# Patient Record
Sex: FEMALE | Race: BLACK OR AFRICAN AMERICAN | NOT HISPANIC OR LATINO | Employment: UNEMPLOYED | URBAN - METROPOLITAN AREA
[De-identification: names, ages, dates, MRNs, and addresses within clinical notes are randomized per-mention and may not be internally consistent; named-entity substitution may affect disease eponyms.]

---

## 2024-01-03 ENCOUNTER — TELEPHONE (OUTPATIENT)
Dept: OBGYN | Facility: CLINIC | Age: 29
End: 2024-01-03

## 2024-01-03 ENCOUNTER — OFFICE VISIT (OUTPATIENT)
Dept: URGENT CARE | Facility: URGENT CARE | Age: 29
End: 2024-01-03
Payer: MEDICAID

## 2024-01-03 VITALS
WEIGHT: 217.1 LBS | HEART RATE: 62 BPM | DIASTOLIC BLOOD PRESSURE: 74 MMHG | SYSTOLIC BLOOD PRESSURE: 125 MMHG | OXYGEN SATURATION: 100 % | TEMPERATURE: 98.1 F | RESPIRATION RATE: 16 BRPM

## 2024-01-03 DIAGNOSIS — Z32.01 PREGNANCY TEST POSITIVE: Primary | ICD-10-CM

## 2024-01-03 DIAGNOSIS — N92.6 MISSED MENSES: ICD-10-CM

## 2024-01-03 LAB — HCG UR QL: POSITIVE

## 2024-01-03 PROCEDURE — 81025 URINE PREGNANCY TEST: CPT | Performed by: NURSE PRACTITIONER

## 2024-01-03 PROCEDURE — 99203 OFFICE O/P NEW LOW 30 MIN: CPT | Performed by: NURSE PRACTITIONER

## 2024-01-03 NOTE — PROGRESS NOTES
SUBJECTIVE: Jayshree is a 28 year old female who comes in today for pregnancy confirmation.  She has not done a pregnancy test at home  This pregnancy was Unplanned, Undesired and they are not planning to parent.    She is a    She is not taking a prenatal vitamin.    She currently complains of nausea &/or vomiting and fatigue.  LMP 11/13/23  Has no insurance    Past medical and OB/Gyn history is as documented in the chart.    OBJECTIVE:   /74 (BP Location: Left arm, Patient Position: Sitting, Cuff Size: Adult Regular)   Pulse 62   Temp 98.1  F (36.7  C) (Tympanic)   Resp 16   Wt 98.5 kg (217 lb 1.6 oz)   LMP 11/13/2023   SpO2 100%     She appears well, in no apparent distress.  Alert and oriented times three, pleasant and cooperative.   Urine HCG was: positive    ASSESSMENT: (Z32.01) Pregnancy test positive  (primary encounter diagnosis)  (N92.6) Missed menses    Plan:    Ob/Gyn   Referral, Specialty Care Coordination Referral to follow up to help her with elective termination     AL Villegas CNP

## 2024-01-03 NOTE — TELEPHONE ENCOUNTER
Pre-Medication  Assessment:    Jayshree Hdz    When was your positive pregnancy test? 1/3/24       Was the test more than 52 days ago (before 23)? No    Were you using hormonal birth control, an IUD or emergency contraception when you got pregnant? No    Have you ever been diagnosed with an ectopic pregnancy? No    Have you ever had a tubal ligation or sterilization procedure? No    Have you ever been diagnosed with pelvic inflammatory disease? No    Are you having one-sided pelvic pain? No    How long are your typical menstrual cycles /  How many days from the start of one period to the start of the next one?  28-29 - periods were regular  -lasted 3-5 days     During the past 3 months, has the time between periods varied from your typical cycles by more than a few days? No, cycles have been regular.    When was the first day of your last period? Patient's last menstrual period was 2023. Patient is sure of LMP date.    Was LMP more than 10 weeks (70 days) ago (before 10/25/23)? No    Did your last period start earlier or later than you would have expected? No    Was your last period shorter than usual? No    Was the amount of bleeding/cramping in your last period normal for you? Yes    Have you had any other recent bleeding that was not normal for you? No      Have you ever been diagnosed with:    An allergy or intolerance to mifepristone or misoprostol?  No    Chronic renal failure?  No    Hemorrhagic disorders? No    Inherited porphyria? No    Have you used systemic corticosteroid therapy long term?  No    Are you currently on anticoagulation therapy (excluding aspirin)?  No    Based on the responses given by the patient, an ultrasound is not indicated.    Patient denies all contraindications, will proceed with scheduling medication termination of pregnancy appointment.      Yamel Quezada RN

## 2024-01-05 ENCOUNTER — OFFICE VISIT (OUTPATIENT)
Dept: MIDWIFE SERVICES | Facility: CLINIC | Age: 29
End: 2024-01-05
Payer: MEDICAID

## 2024-01-05 VITALS
HEART RATE: 99 BPM | DIASTOLIC BLOOD PRESSURE: 79 MMHG | SYSTOLIC BLOOD PRESSURE: 123 MMHG | WEIGHT: 218 LBS | OXYGEN SATURATION: 100 %

## 2024-01-05 DIAGNOSIS — Z33.2 TERMINATION OF PREGNANCY (FETUS): Primary | ICD-10-CM

## 2024-01-05 PROCEDURE — 99203 OFFICE O/P NEW LOW 30 MIN: CPT | Performed by: ADVANCED PRACTICE MIDWIFE

## 2024-01-05 PROCEDURE — S0190 MIFEPRISTONE, ORAL, 200 MG: HCPCS | Performed by: ADVANCED PRACTICE MIDWIFE

## 2024-01-05 RX ORDER — MISOPROSTOL 200 UG/1
800 TABLET ORAL ONCE
Qty: 4 TABLET | Refills: 1 | Status: SHIPPED | OUTPATIENT
Start: 2024-01-05 | End: 2024-01-05

## 2024-01-05 RX ORDER — MIFEPRISTONE 200 MG/1
200 TABLET ORAL ONCE
Status: COMPLETED | OUTPATIENT
Start: 2024-01-05 | End: 2024-01-05

## 2024-01-05 RX ORDER — MIFEPRISTONE 200 MG/1
200 TABLET ORAL ONCE
Status: CANCELLED | OUTPATIENT
Start: 2024-01-05 | End: 2024-01-05

## 2024-01-05 RX ORDER — MISOPROSTOL 200 UG/1
800 TABLET ORAL ONCE
Qty: 4 TABLET | Refills: 1 | Status: CANCELLED | OUTPATIENT
Start: 2024-01-05 | End: 2024-01-05

## 2024-01-05 RX ORDER — ONDANSETRON 4 MG/1
4 TABLET, FILM COATED ORAL EVERY 6 HOURS PRN
Qty: 10 TABLET | Refills: 0 | Status: SHIPPED | OUTPATIENT
Start: 2024-01-05

## 2024-01-05 RX ADMIN — MIFEPRISTONE 200 MG: 200 TABLET ORAL at 15:19

## 2024-01-05 ASSESSMENT — PATIENT HEALTH QUESTIONNAIRE - PHQ9: SUM OF ALL RESPONSES TO PHQ QUESTIONS 1-9: 7

## 2024-01-05 NOTE — PROGRESS NOTES
2024         Subjective:  Jayshree Hdz is a 28 year old No obstetric history on file. at Unknown here today for medication .  Patient has been counseled on pregnancy options and desires medical termination of pregnancy.       Objective:  Dating:  Patient's last menstrual period was 2023.    OB History   No obstetric history on file.        No past medical history on file.    No past surgical history on file.    Current Outpatient Medications   Medication    misoprostol (CYTOTEC) 200 MCG tablet    ondansetron (ZOFRAN) 4 MG tablet     Current Facility-Administered Medications   Medication    miFEPRIStone (MIFEPREX) tablet 200 mg       No Known Allergies          Vitals:    24 1449   BP: 123/79   Pulse: 99   SpO2: 100%   Weight: 98.9 kg (218 lb)       There is no height or weight on file to calculate BMI.     Gen: well-appearing, cooperative, well-groomed      Assessment and Plan:  Jayshree Hdz is a 28 year old No obstetric history on file. at Unknown as dated by LMP who desires termination of pregnancy with medication .     Based the pre-medication  ultrasound assessment, ultrasound prior to  IS NOT indicated.     The patient is appropriate for medication  with:  mifepristone and misoprostol.     Prior to the administration/prescribing of mifepristone, the patient received the medication guide and signed the patient agreement.      Mifepristone administered in clinic. Patient plans to take misoprostol by buccal route.     Counseled patient on   - expected bleeding: Bleeding typically starts 2-4 hours after misoprostol and can be heavy for up to 2 hours. Once pregnancy tissue passes, bleeding should slow down to menstrual type flow but can last up to 2 weeks. Patient counseled to contact our clinic or present to Emergency Department in the case of heavy bleeding (soaking more than two maxi pads per hour for 2 consecutive hours).  - pain: Counseled  patient that the most severe pain occurs approximately 2-4 hours after misoprostol use and lasts 1-2 hours. Advised patient to take ibuprofen 800 mg with misoprostol and repeat as needed every 8 hours. Patient may also alternate with acetaminophen for added pain control (acetaminophen 650 mg every 6 hours).   - potential side effects of misoprostol: nausea, vomiting, diarrhea, headache, dizziness, and thermoregulatory effects such as fever, warmth, hot flushes, or chills  - follow up plan options:   Home Pregnancy Test - Take a urine pregnancy test four weeks after taking  medication(s).  Blood Test - Get a blood test on the day of your appointment or the day you take your first dose of medication to measure the amount of pregnancy hormone (HCG) in your blood.  Get another HCG blood test 1-2 weeks after taking  medication(s).   Ultrasound - Get a vaginal ultrasound 1-2 weeks after taking  medication(s).  Patient plans a home pregnancy test  for follow up.  Also offered to discuss any contraceptive needs/plans with the patient today. Patient plans Mirena IUD.    Chart routed to RN team ([facility] Triage [id number]) who will follow up with patient via telephone within 1 week after clinic visit to assess patient.        AL Crowder CNM

## 2024-01-05 NOTE — PATIENT INSTRUCTIONS
MEDICATION TERMINATION OF PREGNANCY USING MIFEPRISTONE AND MISOPROSTOL    HOW DOES IT WORK?    Mifepristone and misoprostol together are medications that can be taken to end your pregnancy.      HOW WELL DOES THE MEDICATION WORK?    Medication  is highly effective. Medication  is highly effective; it has a success rate of >95% using the standard protocol. Medication  is safe. Serious complications requiring hospitalization for infection treatment or transfusion occur in <0.4% of patients under the standard protocol?. Failed or incomplete medication  may require a suction procedure to complete.    WHAT DO I DO?    You took one tablet of 200 mg mifepristone today during your visit or will take it at home as directed by your provider.   After taking the mifepristone, use the misoprostol.  There are two ways to take misoprostol: vaginal or buccal (between cheek and gum in your mouth).   Vaginal Use of Misoprostol (may be inserted immediately or up to 48 hrs after mifepristone)   Wash your hands and lie down. Place the 4 misoprostol tablets one at a time up into the vagina as far as you can using your finger. It doesn t matter where in the vagina you put the pills. Each misoprostol pill contains 200 micrograms.    If your provider has recommended a second dose of misoprostol, repeat the process 4 hours later with an additional 4 tablets of misoprostol. If you have started to bleed, you can put the pills in your cheeks (between your cheek and your gums), instead of your vagina, for 30 minutes. See  Buccal Use of Misoprostol  below.    Buccal Use of Misoprostol (should be taken between 24-48 hrs after mifepristone)   Place 2 tablets of misoprostol in each cheek (between your cheek and your gums), four tablets total.    Leave them in your cheeks for 30 minutes to dissolve.    After 30 minutes swallow the pills with a large glass of water.   If your provider has recommended a second dose of  misoprostol, repeat steps i. through iii. above after 4 hours.    You may take ibuprofen (800 mg total) and/or acetaminophen (650mg) by mouth one hour before using the misoprostol. This may help with cramping. See further information on pain management below.       WHAT HAPPENS NEXT?   Vaginal bleeding with clots is an expected part of this process. The cramps and bleeding may be stronger than your normal period. The cramps and heavy bleeding usually start 2 to 4 hours after you use the misoprostol pills. This heavy bleeding means the pills are working. After the pregnancy tissue passes, the bleeding will slow down and get lighter and lighter. It is normal to pass small clots and sometimes the bleeding may seem to increase when you get up suddenly or go to the toilet. Bleeding may continue on and off for up to 4 weeks.     Lower abdominal cramping pain, especially in the middle of your lower abdomen can occur any time after you take the misoprostol.? Cramping may be similar or sometimes much greater than with a menstrual period and can last for a couple of days. If you continue to have pain and cramps after taking the ibuprofen (as directed above), then you can use additional pain medicine such as acetaminophen (Tylenol).?   Nausea, diarrhea: These symptoms should get better a few hours after using the pills and should not last longer than 24 hours.    Fever and chills: You may have fever and chills the day you take the misoprostol. It is NOT normal to have a fever after that. Call us right away if you do. It could be a sign that you are getting an infection.   You will receive a phone call from a clinic nurse within a week of your visit.    You can expect a period in 4 to 8 weeks after using mifepristone and misoprostol but this varies quite a bit depending upon a person s normal menstrual cycle.      WHAT CAN I TAKE FOR PAIN, NAUSEA, DIARRHEA?    Pain:    Take ibuprofen (Motrin or Advil) 800 mg every 8 hours as  needed for pain. Take this with food to avoid an upset stomach. You may also alternate this with acetaminophen (Tylenol) 650mg every 6 hours for added pain control.   Comfort: A hot pack may help with cramps. You can also drink some hot tea. Rest in a soothing place.    Nausea   Take ondansetron or promethazine as needed for nausea and vomiting as needed for nausea and vomiting if prescribed.   Diarrhea   Take Loperamide as needed for diarrhea. Take 2 capsules after the first loose bowel movement. Can take 1 capsule after each additional loose stool. Do not take more than 8 capsules in a day.     WHEN SHOULD I CALL MY HEALTHCARE PROVIDER?    Your bleeding soaks through more than 2 maxi pads per hour for 2 hours in a row   You do not bleed within 24 hours after taking the misoprostol   You continue to feel sick more than 24 hours after taking the misoprostol:   Fever on an oral thermometer of 100.4 degrees Fahrenheit, severe stomach area (abdominal) pain, weakness, nausea, vomiting, or diarrhea     The clinic phone is answered 24 hours per day. If it is after clinic hours, leave a message with the answering service and a health care provider will call you back. Please call if you have any questions, think something is going wrong, or think you have an emergency. If you do not receive a call back within an hour, go to the nearest emergency room.              FEELINGS AFTER ENDING PREGNANCY    People have many different feelings after using the medications to end a pregnancy. The most common emotion people report is relief, but people can also feel many other emotions. If you think your emotions are not what they should be, please talk to us.        References:   American College of Obstetricians and Gynecologists  Committee on Practice Bulletins--Gynecology, Society of Family Planning. Medication  Up to 70 Days of Gestation: ACOG Practice Bulletin, Number 225. Obstet Gynecol. 2020;136(4):e31-e47. doi:  10.1097/AOG.3705561263053310.    Reproductive Health Access Project. Medication  Aftercare Instructions (vaginal miso). May 2022. Available at: https://www.reproductiveaccess.org/wp-content/uploads/2093-11-Fxwshtsce_ImctjqoOjfiPhu-final.pdf   Reproductive Health Access Project. Medication  Aftercare Instructions (buccal miso). May 2022. Available at: https://www.reproductiveaccess.org/wp-content/uploads/-english-buccal-med-ab-aftercare_final.pdf

## 2024-01-08 ENCOUNTER — PATIENT OUTREACH (OUTPATIENT)
Dept: CARE COORDINATION | Facility: CLINIC | Age: 29
End: 2024-01-08
Payer: MEDICAID

## 2024-01-08 ENCOUNTER — TELEPHONE (OUTPATIENT)
Dept: MIDWIFE SERVICES | Facility: CLINIC | Age: 29
End: 2024-01-08
Payer: MEDICAID

## 2024-01-08 NOTE — Clinical Note
Melissa Tompkins, just wanted to send an FYI as I see their was an FRW referral placed- this patient has not established care with our clinics and wants to think about if she would like to or not. I will CC a message to you tomorrow after I speak to her again as I will need to close the CC program if she chooses not to establish care with us. Thanks!

## 2024-01-08 NOTE — PROGRESS NOTES
Clinic Care Coordination Contact  Care Team Conversations    ADRIEL PONCE outreached to patient to discuss care coordination prior to our phone visit on 1/9 due to patient not having established care with our clinics. ADRIEL PONCE explained patient needs to establish care with a PCP at one of our [facility] clinics to work with care coordination and patient said she would need to think about it and would call ADRIEL PONCE back.     ADRIEL PONCE will call patient again tomorrow at 1pm if I do not hear back. FYI to FRW as referral was placed.     Gracie Devries, [facility]  Social Work Primary Care Clinic Care Coordinator  [facility]  [phone]  [email or web address]

## 2024-01-08 NOTE — PROGRESS NOTES
Clinic Care Coordination Contact  Community Health Worker Initial Outreach    CHW Initial Information Gathering:  Referral Source: Other, specify (Seen by Melvina Dorman CNP, for UC on 1/3/24)  Current living arrangement:: I live in a private home with family (grandmother and pt's 2 children)  Type of residence:: Town home  Equipment Currently Used at Home: none  Informal Support system:: Family, Friends  No PCP office visit in Past Year: Yes (Would like to establishe with Melvina Dorman CNP, at the [facility])  Transportation means:: Other ([employer])  CHW Additional Questions  If ED/Hospital discharge, follow-up appointment scheduled as recommended?: N/A  Medication changes made following ED/Hospital discharge?: N/A  MyChart active?: No  Patient agreeable to assistance with activating MyChart?: Other (Did not ask)    Patient accepts CC: Yes. Patient scheduled for assessment with KRIS Whittaker, on 1/9/24 at 1:00 pm. Patient noted desire to discuss applying for Section 8 housing, food resources.       Financial Resource Worker Screening    [facility] Benefits  Is patient requesting help applying for [facility] benefits?: Yes  Have you recently applied for any county benefits?: No  How many people in your household?: 4  Do you buy/eat food together?: Yes  What is the monthly gross income for the household (wages, social security, workers comp, and pension)? : 0    Insurance:  [place] insurance?: No  Is this an insurance renewal?: No  Is this a new insurance application request?: Yes  Have you recently applied for insurance?: Yes  What date did you apply for insurance?: Last week  How many people in your household? : 4  Do you file taxes?: Yes (Pt plans to file taxes this year)  How many dependents do you claim?: 2  What is the monthly gross income for the household (wages, social security, workers comp, and pension)? : 0    Any other information for the FRW?: Pt applied for [place] last week. Would  like to explore getting [facility] benefits.    Spoke briefly to pt this morning:  Housing - trying to see if I can get into Section 8.  Unemployed - has been looking for apartments, but doesn't have the down payment needed or income to pay for an apartment.  Insurance - applied for [place] last week at [facility]  Food - living with her grandmother and pt's 2 children. Grandmother is currently paying for food right now. Would like food resources  PCP - pt does not have one. She saw Melvina Dorman CNP, at the [facility] on 1/3/24 and would like to establish with her for her primary care.     Valerie English  Community Health Worker  [facility]  [phone]

## 2024-01-09 ENCOUNTER — PATIENT OUTREACH (OUTPATIENT)
Dept: CARE COORDINATION | Facility: CLINIC | Age: 29
End: 2024-01-09

## 2024-01-10 NOTE — TELEPHONE ENCOUNTER
Attempt patient to complete [facility] follow up.  LVM for patient to call back.    SHARMAINE Kim, Rachana Salgado, APRN CNM  [facility] Triage  Hi there,    Jayshree was seen in clinic today for medication termination of pregnancy. Can you follow up with her in 2-3 days? Thank you!    Ale  
Attempted to call patient.  Patient answered phone and then hung up.  Attempted a second call - went straight to voicemail.    M for patient to call back.    Yamel Quezada RN    
Called patient and notified we were calling to follow up.  Patient states she is not interested in following up and to take her off the call list.    Advised we are required to follow up after her termination.  Patient states she is doing fine and her bleeding is fine and she will come in for her follow up appointment.    Patient then hung up.    Unable to go through [facility]OP follow up assessment with patient.  Routing to provider as an FYI.    Yamel Quezada RN    
No

## 2024-05-27 ENCOUNTER — OFFICE VISIT (OUTPATIENT)
Dept: URGENT CARE | Facility: URGENT CARE | Age: 29
End: 2024-05-27
Payer: COMMERCIAL

## 2024-05-27 VITALS
DIASTOLIC BLOOD PRESSURE: 97 MMHG | OXYGEN SATURATION: 98 % | WEIGHT: 208.7 LBS | RESPIRATION RATE: 20 BRPM | SYSTOLIC BLOOD PRESSURE: 134 MMHG | HEART RATE: 87 BPM | TEMPERATURE: 96.5 F

## 2024-05-27 DIAGNOSIS — S01.01XA LACERATION OF SCALP, INITIAL ENCOUNTER: ICD-10-CM

## 2024-05-27 DIAGNOSIS — S09.90XA INJURY OF HEAD, INITIAL ENCOUNTER: Primary | ICD-10-CM

## 2024-05-27 PROCEDURE — 99203 OFFICE O/P NEW LOW 30 MIN: CPT | Performed by: PHYSICIAN ASSISTANT

## 2024-05-27 NOTE — PROGRESS NOTES
SUBJECTIVE:   Jayshree Hdz is a 28 year old female presenting with a chief complaint of   Chief Complaint   Patient presents with    Fall     Today around 11-12 today, hit her head on the cement there there is a laceration        She is an established patient of [facility].  Patient states she fell hitting the back of her head on the sidewalk.  No LOC.  No vomiting.          Review of Systems    No past medical history on file.  No family history on file.  Current Outpatient Medications   Medication Sig Dispense Refill    ondansetron (ZOFRAN) 4 MG tablet Take 1 tablet (4 mg) by mouth every 6 hours as needed for nausea 10 tablet 0     Social History     Tobacco Use    Smoking status: Not on file    Smokeless tobacco: Not on file   Substance Use Topics    Alcohol use: Not on file       OBJECTIVE  BP (!) 134/97 (BP Location: Left arm, Patient Position: Sitting, Cuff Size: Adult Large)   Pulse 87   Temp (!) 96.5  F (35.8  C) (Tympanic)   Resp 20   Wt 94.7 kg (208 lb 11.2 oz)   LMP 05/03/2024 (Exact Date)   SpO2 98%   Breastfeeding Unknown     Physical Exam  Vitals and nursing note reviewed.   Constitutional:       Appearance: Normal appearance. She is normal weight.   HENT:      Head:        Comments: 1/2 cm linear laceration.  Not currently bleeding.  Eyes:      Extraocular Movements: Extraocular movements intact.      Conjunctiva/sclera: Conjunctivae normal.   Cardiovascular:      Rate and Rhythm: Normal rate.   Neurological:      General: No focal deficit present.      Mental Status: She is alert.      Coordination: Coordination normal.      Gait: Gait normal.         Labs:  No results found for this or any previous visit (from the past 24 hour(s)).    ASSESSMENT:      ICD-10-CM    1. Injury of head, initial encounter  S09.90XA       2. Laceration of scalp, initial encounter  S01.01XA            Medical Decision Making:    Differential Diagnosis:  laceration    Serious Comorbid Conditions:  Adult:    reviewed    PLAN:    Bacitracin applied.  Tylenol/motrin prn.  Discussed reasons to seek immediate medical attention.  Additionally if no improvement or worsening in one week, may follow up with PCP and/or UC.  No red flag signs or symptoms.  Discussed reasons to seek immediate medical attention.  Patient given samples of bacitracin.        Followup:    If not improving or if condition worsens, follow up with your Primary Care Provider, If not improving or if conditions worsens over the next 12-24 hours, go to the Emergency Department    There are no Patient Instructions on file for this visit.